# Patient Record
Sex: MALE | Race: BLACK OR AFRICAN AMERICAN
[De-identification: names, ages, dates, MRNs, and addresses within clinical notes are randomized per-mention and may not be internally consistent; named-entity substitution may affect disease eponyms.]

---

## 2017-11-01 ENCOUNTER — HOSPITAL ENCOUNTER (EMERGENCY)
Dept: HOSPITAL 76 - ED | Age: 57
Discharge: HOME | End: 2017-11-01
Payer: OTHER GOVERNMENT

## 2017-11-01 VITALS — DIASTOLIC BLOOD PRESSURE: 92 MMHG | SYSTOLIC BLOOD PRESSURE: 137 MMHG

## 2017-11-01 DIAGNOSIS — F17.200: ICD-10-CM

## 2017-11-01 DIAGNOSIS — I10: ICD-10-CM

## 2017-11-01 DIAGNOSIS — R07.89: Primary | ICD-10-CM

## 2017-11-01 LAB
ALBUMIN/GLOB SERPL: 0.7 {RATIO} (ref 1–2.2)
ANION GAP SERPL CALCULATED.4IONS-SCNC: 11 MMOL/L (ref 6–13)
BASOPHILS NFR BLD AUTO: 0 10^3/UL (ref 0–0.1)
BASOPHILS NFR BLD AUTO: 0.5 %
BILIRUB BLD-MCNC: 1.1 MG/DL (ref 0.2–1)
BUN SERPL-MCNC: 12 MG/DL (ref 6–20)
CALCIUM UR-MCNC: 9.5 MG/DL (ref 8.5–10.3)
CHLORIDE SERPL-SCNC: 99 MMOL/L (ref 101–111)
CO2 SERPL-SCNC: 27 MMOL/L (ref 21–32)
CREAT SERPLBLD-SCNC: 0.8 MG/DL (ref 0.6–1.2)
EOSINOPHIL # BLD AUTO: 0.1 10^3/UL (ref 0–0.7)
EOSINOPHIL NFR BLD AUTO: 1.3 %
ERYTHROCYTE [DISTWIDTH] IN BLOOD BY AUTOMATED COUNT: 13.8 % (ref 12–15)
GFRSERPLBLD MDRD-ARVRAT: 120 ML/MIN/{1.73_M2} (ref 89–?)
GLOBULIN SER-MCNC: 5.2 G/DL (ref 2.1–4.2)
GLUCOSE SERPL-MCNC: 99 MG/DL (ref 70–100)
HCT VFR BLD AUTO: 41.4 % (ref 42–52)
HGB UR QL STRIP: 14.4 G/DL (ref 14–18)
LIPASE SERPL-CCNC: 26 U/L (ref 22–51)
LYMPHOCYTES # SPEC AUTO: 1.4 10^3/UL (ref 1.5–3.5)
LYMPHOCYTES NFR BLD AUTO: 30.5 %
MCH RBC QN AUTO: 33.6 PG (ref 27–31)
MCHC RBC AUTO-ENTMCNC: 34.9 G/DL (ref 32–36)
MCV RBC AUTO: 96.3 FL (ref 80–94)
MONOCYTES # BLD AUTO: 0.5 10^3/UL (ref 0–1)
MONOCYTES NFR BLD AUTO: 10 %
NEUTROPHILS # BLD AUTO: 2.6 10^3/UL (ref 1.5–6.6)
NEUTROPHILS # SNV AUTO: 4.6 X10^3/UL (ref 4.8–10.8)
NEUTROPHILS NFR BLD AUTO: 57.7 %
NRBC # BLD AUTO: 0.1 /100WBC
PDW BLD AUTO: 7.7 FL (ref 7.4–11.4)
POTASSIUM SERPL-SCNC: 3.8 MMOL/L (ref 3.5–5)
PROT SPEC-MCNC: 8.9 G/DL (ref 6.7–8.2)
RBC MAR: 4.3 10^6/UL (ref 4.7–6.1)
SODIUM SERPLBLD-SCNC: 137 MMOL/L (ref 135–145)
WBC # BLD: 4.6 X10^3/UL

## 2017-11-01 PROCEDURE — 36415 COLL VENOUS BLD VENIPUNCTURE: CPT

## 2017-11-01 PROCEDURE — 96375 TX/PRO/DX INJ NEW DRUG ADDON: CPT

## 2017-11-01 PROCEDURE — 83690 ASSAY OF LIPASE: CPT

## 2017-11-01 PROCEDURE — 80053 COMPREHEN METABOLIC PANEL: CPT

## 2017-11-01 PROCEDURE — 71020: CPT

## 2017-11-01 PROCEDURE — 85025 COMPLETE CBC W/AUTO DIFF WBC: CPT

## 2017-11-01 PROCEDURE — 84484 ASSAY OF TROPONIN QUANT: CPT

## 2017-11-01 PROCEDURE — 86430 RHEUMATOID FACTOR TEST QUAL: CPT

## 2017-11-01 PROCEDURE — 99284 EMERGENCY DEPT VISIT MOD MDM: CPT

## 2017-11-01 PROCEDURE — 96374 THER/PROPH/DIAG INJ IV PUSH: CPT

## 2017-11-01 PROCEDURE — 99283 EMERGENCY DEPT VISIT LOW MDM: CPT

## 2017-11-01 PROCEDURE — 93005 ELECTROCARDIOGRAM TRACING: CPT

## 2017-11-01 PROCEDURE — 85651 RBC SED RATE NONAUTOMATED: CPT

## 2017-11-01 PROCEDURE — 86038 ANTINUCLEAR ANTIBODIES: CPT

## 2017-11-03 LAB — ANA SER QL: NEGATIVE

## 2018-05-23 ENCOUNTER — HOSPITAL ENCOUNTER (EMERGENCY)
Age: 58
Discharge: HOME | End: 2018-05-23
Payer: COMMERCIAL

## 2018-05-23 VITALS
DIASTOLIC BLOOD PRESSURE: 90 MMHG | HEART RATE: 89 BPM | OXYGEN SATURATION: 100 % | RESPIRATION RATE: 15 BRPM | SYSTOLIC BLOOD PRESSURE: 117 MMHG | TEMPERATURE: 98.06 F

## 2018-05-23 VITALS — BODY MASS INDEX: 26.3 KG/M2

## 2018-05-23 DIAGNOSIS — L85.3: Primary | ICD-10-CM

## 2018-05-23 PROCEDURE — 99282 EMERGENCY DEPT VISIT SF MDM: CPT

## 2018-05-23 NOTE — ED_ITS
"HPI - Skin/Abscess/Foreign Bdy    
<RADHA Kong - Last Filed: 05/23/18 21:37>    
General    
Chief complaint: Extremity Problem,Nontraumatic    
Stated complaint: RASH ON LEFT LEG    
Time Seen by Provider: 05/23/18 20:38    
History of Present Illness    
HPI narrative: 57-year-old male here for complaint of having dry flaky skin to   
his left leg with skin breaks.  He states that he has had symptoms like this   
for the past year.  He states that he does keep scratching the area due to itch   
and peeling off the flaky skin.  He denies any purulent drainage.  He denies   
any fevers or chills. No recent trauma to the area.  Patient is ambulatory into   
the emergency room.  No other concerns or complaints.    
MD complaint: rash    
Related Data    
 Previous Rx's    
    
    
    
 Medication  Instructions  Recorded    
     
mupirocin 1 applictn TOP BID #15 gram 05/23/18    
    
    
    
 Allergies    
    
    
    
Allergy/AdvReac Type Severity Reaction Status Date / Time    
     
No Known Drug Allergies Allergy   Verified 05/23/18 20:49    
    
    
    
    
Review of Systems    
<RADHA Kong - Last Filed: 05/23/18 21:37>    
Constitutional    
Denies chills, Denies fever(s), Denies lethargy and Denies weakness    
Eyes    
Denies change in vision, Denies eye discharge, Denies irritation and Denies   
loss of vision    
ENT    
Ears, Nose, Mouth, and Throat: Denies change in voice, Denies neck pain and   
Denies sore throat    
Cardiovascular    
Denies chest pain, Denies irregular heart rhythm, Denies lightheadedness,   
Denies palpitations, Denies dyspnea, Denies dyspnea on exertion and Denies   
orthopnea    
Respiratory    
Denies cough, Denies dyspnea, Denies dyspnea on exertion and Denies wheezing    
Genitourinary    
Denies hematuria, Denies flank pain, Denies urinary incontinence and Denies   
urinary urgency    
Musculoskeletal    
Denies neck pain    
Integumentary/Breasts    
Reports pruritus and Reports rash    
Neurologic    
Denies confusion, Denies loss of vision and Denies weakness    
Psychiatric    
Denies anxiety, Denies confusion, Denies depression, Denies homicidal ideation   
and Denies suicidal ideation    
Endocrine    
Denies palpitations    
Allergic/Immunologic    
Denies wheezing    
    
Exam    
<RADHA Kong - Last Filed: 05/23/18 21:37>    
Initial Vital Signs    
Initial Vital Signs:  Vital Signs    
    
    
    
Temperature  98.1 F   05/23/18 20:49    
     
Pulse Rate  89   05/23/18 20:49    
     
Respiratory Rate  15   05/23/18 20:49    
     
Blood Pressure  117/90 H  05/23/18 20:49    
     
Pulse Oximetry  100   05/23/18 20:49    
    
    
    
Const    
General: cooperative and well developed    
Nutritional Appearance: well nourished    
Orientation: alert, awake, oriented x3 and not confused    
[facility]    
Mouth: oral mucosae normal, oropharynx normal and moist mucous membranes    
Eyes    
Eyelids: eyelids normal    
Conjunctivae: conjunctivae normal    
Sclera: sclerae normal    
Cornea: corneas normal     
Pupils: PERRL    
Resp    
Effort & Inspection: normal respiratory effort, able to speak in complete   
sentences, no respiratory distress and no use of accessory muscles    
Auscultation: clear to auscultation bilaterally, no rales, no rhonchi and no   
wheezes    
Cardio    
Rate: regular rate    
Rhythm: regular rhythm    
Heart Sounds: no click, no gallops, no murmurs and no rubs    
Skin    
Other: Six in area of dry flaky skin with excoriation and abrasions to the left   
anterior shin.  Distal sensation is intact.  Distal range of motion is intact.    
Distal cap refill less than 2 sec.    
    
<Hero Tapia, DO - Last Filed: 05/23/18 22:09>    
Initial Vital Signs    
Initial Vital Signs:  Vital Signs    
    
    
    
Temperature  98.1 F   05/23/18 20:49    
     
Pulse Rate  89   05/23/18 20:49    
     
Respi
[id number]|2018-05-23 20:38:32|2018-05-23 20:38:32|ED.SKABFB||||"HPI - Skin/Abscess/Foreign Bdy

## 2018-05-23 NOTE — PC.NURSE
itchy, dry rash to lt shin no improvement with otc topical use. no swelling or erythema noted, several small open areas with scant bloody drainage

## 2018-07-14 ENCOUNTER — HOSPITAL ENCOUNTER (EMERGENCY)
Age: 58
Discharge: HOME | End: 2018-07-14
Payer: COMMERCIAL

## 2018-07-14 VITALS
RESPIRATION RATE: 15 BRPM | TEMPERATURE: 97.52 F | OXYGEN SATURATION: 95 % | DIASTOLIC BLOOD PRESSURE: 63 MMHG | SYSTOLIC BLOOD PRESSURE: 116 MMHG | HEART RATE: 88 BPM

## 2018-07-14 VITALS
SYSTOLIC BLOOD PRESSURE: 141 MMHG | DIASTOLIC BLOOD PRESSURE: 86 MMHG | HEART RATE: 89 BPM | OXYGEN SATURATION: 99 % | RESPIRATION RATE: 18 BRPM

## 2018-07-14 DIAGNOSIS — L03.116: Primary | ICD-10-CM

## 2018-07-14 DIAGNOSIS — I87.2: ICD-10-CM

## 2018-07-14 PROCEDURE — 99283 EMERGENCY DEPT VISIT LOW MDM: CPT

## 2018-07-14 NOTE — ED.EXTPRO
"HPI - Extremity Problem
General
Chief complaint: Extremity Problem,Nontraumatic
Stated complaint: left leg swelling and pain
Time Seen by Provider: 07/14/18 18:24
Source: patient and family
Mode of arrival: ambulatory
Limitations: no limitations
History of Present Illness
HPI Narrative: 57-year-old male with history lower like infection presents with a few days of worsening redness and pain with mild drainage of left anterior shin.  He states his problems all started when he struck his shin on a coffee table months 
ago.  He was seen here few months ago under similar circumstances.  He recently completed a course of clindamycin which completely cleared up his infection which has since returned.  He denies any new injury or systemic findings such as fever, 
chills nor nausea or vomiting
MD Complaint: extremity pain and extremity swelling
Onset (ago): day(s)
Location: left
Quality: burning and aching
Radiation: none
Relieving factors: nothing
Exacerbating factors: walking
Related Data
Home Medications

 Medication  Instructions  Recorded  Confirmed
aspirin 81 mg PO DAILY 07/14/18 07/14/18
clindamycin HCl 300 mg PO TID 07/14/18 07/14/18
cyclobenzaprine 10 mg PO TID 07/14/18 07/14/18
meloxicam 15 mg PO DAILY 07/14/18 07/14/18
nortriptyline 25 mg PO BEDTIME 07/14/18 07/14/18
omeprazole 20 mg PO DAILY 07/14/18 07/14/18
prazosin 1 mg PO BEDTIME 07/14/18 07/14/18
propranolol 20 mg PO BID 07/14/18 07/14/18
sertraline 50 mg PO DAILY 07/14/18 07/14/18
sildenafil 50 mg PO DAILY PRN 07/14/18 07/14/18
sofosbuvir-velpatasvir 1 tab PO DAILY 07/14/18 07/14/18

Previous Rx's

 Medication  Instructions  Recorded
mupirocin 1 applictn TOP BID #15 gram 05/23/18
clindamycin HCl 300 mg PO TID 7 Days #21 cap 07/14/18


Allergies

Allergy/AdvReac Type Severity Reaction Status Date / Time
No Known Drug Allergies Allergy   Verified 07/14/18 18:24



Review of Systems
Review of Systems
All systems reviewed & are unremarkable except as noted in HPI and below 
Constitutional
Denies chills, Denies fever(s), Denies lethargy and Denies weakness
Eyes
Denies change in vision, Denies eye discharge, Denies irritation and Denies loss of vision
ENT
Ears, Nose, Mouth, and Throat: Denies change in voice, Denies neck pain and Denies sore throat
Cardiovascular
Denies chest pain, Denies irregular heart rhythm, Denies lightheadedness, Denies palpitations, Denies dyspnea, Denies dyspnea on exertion and Denies orthopnea
Respiratory
Denies cough, Denies dyspnea, Denies dyspnea on exertion and Denies wheezing
Gastrointestinal
Gastrointestinal: Denies abdominal pain, Denies change in bowel habits, Denies diarrhea, Denies nausea and Denies vomiting
Genitourinary
Denies hematuria, Denies flank pain, Denies urinary incontinence and Denies urinary urgency
Musculoskeletal
Denies neck pain
Integumentary/Breasts
Reports dry skin, Denies pruritus, Reports erythema, Denies rash, Reports skin pain, Reports skin swelling, Reports sores and Denies wounds
Neurologic
Denies confusion, Denies loss of vision and Denies weakness
Psychiatric
Denies anxiety, Denies confusion, Denies depression, Denies homicidal ideation and Denies suicidal ideation
Endocrine
Denies palpitations
Hematologic/Lymphatic
Denies easy bruising
Allergic/Immunologic
Denies wheezing

PFSH
Social History
Smoking Status:  Current every day smoker




Exam
Initial Vital Signs
Initial Vital Signs:  Vital Signs

Temperature  97.5 F L  07/14/18 18:24
Pulse Rate  88   07/14/18 18:24
Respiratory Rate  15   07/14/18 18:24
Blood Pressure  116/63   07/14/18 18:24
Pulse Oximetry  95   07/14/18 18:24


Const
General: cooperative and well developed
Nutritional Appearance: well nourished
Orientation: alert, awake, oriented x3 and not confused
Eyes
General: appearance normal, both eyes and all related structures
Eyelids: eyelids normal
Conjunctivae: conjunctivae normal
Sclera: sclerae normal
Pupils: PERRL
EOM: EOM intact bilatera
[id number]|2018-07-14 18:51:41|2018-07-14 18:51:41|ED.EXTPRO||||"HPI - Extremity Problem

## 2018-07-25 ENCOUNTER — HOSPITAL ENCOUNTER (EMERGENCY)
Dept: HOSPITAL 73 - ED | Age: 58
Discharge: HOME | End: 2018-07-25
Payer: COMMERCIAL

## 2018-07-25 VITALS
HEART RATE: 86 BPM | OXYGEN SATURATION: 98 % | DIASTOLIC BLOOD PRESSURE: 90 MMHG | SYSTOLIC BLOOD PRESSURE: 117 MMHG | TEMPERATURE: 97.6 F | RESPIRATION RATE: 14 BRPM

## 2018-07-25 VITALS
SYSTOLIC BLOOD PRESSURE: 108 MMHG | DIASTOLIC BLOOD PRESSURE: 82 MMHG | RESPIRATION RATE: 16 BRPM | OXYGEN SATURATION: 96 % | HEART RATE: 75 BPM

## 2018-07-25 DIAGNOSIS — T78.40XA: Primary | ICD-10-CM

## 2018-07-25 PROCEDURE — 99282 EMERGENCY DEPT VISIT SF MDM: CPT

## 2018-07-25 NOTE — ED.SKABFB
"HPI - Skin/Abscess/Foreign Bdy
General
Chief complaint: Skin/Abscess/Foreign Body
Stated complaint: RASH,CELLULITIS LEG
Time Seen by Provider: 07/25/18 10:40
Source: patient and family
Mode of arrival: ambulatory
Limitations: no limitations
History of Present Illness
HPI narrative: 57-year-old pleasant male, known to myself and other staff presents with ongoing left anterior leg problems with sloughing of skin and some itchy plaque like lesions on his leg and now on his forearms.  He denies any trouble 
swallowing or breathing.  He has had no fever or chills
MD complaint: rash
Onset (ago): day(s)
Tetanus up to date: yes
Location: LLE
Severity: moderate
Quality: pruritic
Relieving factors: none
Exacerbating factors: none
Context: new medication
Treatments prior to arrival: none and antibiotic
Related Data
Home Medications

 Medication  Instructions  Recorded  Confirmed
aspirin 81 mg PO DAILY 07/14/18 07/25/18
cyclobenzaprine 10 mg PO TID 07/14/18 07/25/18
meloxicam 15 mg PO DAILY 07/14/18 07/25/18
nortriptyline 25 mg PO BEDTIME 07/14/18 07/25/18
omeprazole 20 mg PO DAILY 07/14/18 07/25/18
prazosin 1 mg PO BEDTIME 07/14/18 07/25/18
propranolol 20 mg PO BID 07/14/18 07/25/18
sertraline 50 mg PO DAILY 07/14/18 07/25/18
sildenafil 50 mg PO DAILY PRN 07/14/18 07/25/18
sofosbuvir-velpatasvir 1 tab PO DAILY 07/14/18 07/25/18

Previous Rx's

 Medication  Instructions  Recorded
mupirocin 1 applictn TOP BID #15 gram 05/23/18
prednisone 20 mg PO DAILY 5 Days  tab 07/25/18


Allergies

Allergy/AdvReac Type Severity Reaction Status Date / Time
No Known Drug Allergies Allergy   Verified 07/25/18 10:47



Review of Systems
Review of Systems
All systems reviewed & are unremarkable except as noted in HPI and below 
Constitutional
Denies chills, Denies fever(s), Denies lethargy and Denies weakness
Eyes
Denies change in vision, Denies eye discharge, Denies irritation and Denies loss of vision
ENT
Ears, Nose, Mouth, and Throat: Denies change in voice, Denies neck pain and Denies sore throat
Cardiovascular
Denies chest pain, Denies irregular heart rhythm, Denies lightheadedness, Denies palpitations, Denies dyspnea, Denies dyspnea on exertion and Denies orthopnea
Respiratory
Denies cough, Denies dyspnea, Denies dyspnea on exertion and Denies wheezing
Gastrointestinal
Gastrointestinal: Denies abdominal pain, Denies change in bowel habits, Denies diarrhea, Denies nausea and Denies vomiting
Genitourinary
Denies hematuria, Denies flank pain, Denies urinary incontinence and Denies urinary urgency
Musculoskeletal
Denies neck pain
Integumentary/Breasts
Reports pruritus, Reports lesions, Denies erythema, Reports rash and Denies wounds
Neurologic
Denies confusion, Denies loss of vision and Denies weakness
Psychiatric
Denies anxiety, Denies confusion, Denies depression, Denies homicidal ideation and Denies suicidal ideation
Endocrine
Denies palpitations
Hematologic/Lymphatic
Denies easy bruising
Allergic/Immunologic
Denies wheezing

PFSH
Medical History (Reviewed 07/25/18 @ 16:10 by Hero Tapia DO)

Anxiety (Acute)
Gastro-esophageal reflux (Acute)
Post traumatic stress disorder (PTSD) (Acute)


Social History (Reviewed 07/25/18 @ 16:10 by Hero Tapia DO)
Smoking Status:  Current every day smoker




Exam
Narrative
Exam Narrative: GEN: AOx3 and in mild distress

EYES: Pupils are equal, round, and reactive to light and accommodation. Extraoccular muscles are intact bilaterally. There is no subconjunctival hemorrhage or exudate.

CHEST: Lungs are clear to auscultation bilaterally and free of wheezes, rales, or rhonchi. Heart rate is regular rhythm, there are no murmurs, clicks, rubs, or gallops. There is no chest wall tenderness. 

ABD: Abdomen is soft and nontender.  There is no guarding or rebound. Bowel sounds are normal in all 4 quadrants. There is no mass or organomegaly.

EXT: Full painless ROM of all extremities with no loss of sensation or str
[id number]|2018-07-25 16:12:00|2018-07-25 11:44:00|ED_ITS|[facility]|Emergency Department|[id number]|"HPI - Skin/Abscess/Foreign Bdy

## 2018-08-01 ENCOUNTER — HOSPITAL ENCOUNTER (OUTPATIENT)
Age: 58
End: 2018-08-01
Payer: OTHER GOVERNMENT

## 2018-08-01 DIAGNOSIS — L03.116: Primary | ICD-10-CM

## 2018-08-01 PROCEDURE — 99213 OFFICE O/P EST LOW 20 MIN: CPT

## 2018-08-01 NOTE — OV.WND_ITS
Progress Note Details  
Patient Name: Marleen Camargo  
Patient Number: [id number]  
PatientPatientDate: 8/1/2018  
Clinician: Cheryle Cardenas  
Clinician Cosigner: Carito Lai  
Physician / Extender: Rubens Peoples  
--------------------------------------------------------------------------------
--------------  
SUBJECTIVE  
Chief Complaint  
This information was obtained from the patient  
Left Leg Cellulitis for two months.  
Allergies  
NKDA  
HPI  
This information was obtained from the patient  
8/1/18. Seen by Dr. Peoples. The patient is new to our clinic and presents with 
chronic cellulitis  
of the left lower leg and a recent history of associated ulcers which were 
recently reviewed in  
our ER. He's been treated with at least one course of antibiotics for this 
which is believed to be  
clindamycin and feels although helpful the cellulitis has not resolved. This 
morning he feels it's  
worse than over the past week, he reports pain in the lower leg, but does not 
report fevers or  
feeling unwell otherwise. He also does not have a known history of diabetes or 
PAD.  
Family History  
This information was obtained from the patient  
Unknown History - Maternal Grandparents, Paternal Grandparents, Diabetes - 
Mother, Father  
Social History  
This information was obtained from the patient  
Current every day smoker, Children - 2 , Lives in - With Ex-Wife, Marital 
Status -  but  
live with ex-wife, Retired - Disability,  - [employer]  
General Notes:  
Unable to get complete history. [facility] 
since his insurance  
will only cover 80% of this visit and he will have to pay 20%. Had Dr. Peoples 
come in to see  
patient before all history was gathered.  
Past Medical History  
This information was obtained from the patient  
Patient has a medical history of:  
Alzheimer's  
PTSD  
Hypertension  
Anxiety  
Hepatitis (Hep C)  
General Notes:  
Unable to obtain.  
Complaints and Symptoms  
This information was obtained from the patient  
Patient complains of:  
General Notes: I have reviewed and concur with the Review of Systems and Past 
Family Social  
History documents completed by the clinician, I have reviewed and concur with 
the Wound  
Assessment document completed by the clinician  
Cardiovascular (Central/Peripheral): Lower extremity (leg) swelling  
Prior Wound History: Drainage  
Psychiatric: Memory Loss  
Patient denies complaints or symptoms related to:  
Cardiovascular (Central/Peripheral): Intermittent Claudication, Lower extremity 
(leg) resting  
pain  
Constitutional Symptoms (General Health): Chills, Fever  
Ear/Nose/Mouth/Throat: Hearing Loss / Aid  
Hematologic/Lymphatic: Bleeding / Clotting Disorders, Bleeding Tendency  
Integumentary (Hair/Skin/Nails): Open Sore  
Neurological: Loss of Protective Sensation  
General Notes:  
[facility]. Flu, Shingles, Pneumonia, Hep B  
Additional Information  
Does patient have a history of Cancer? Yes? Complete all questions.: No  
Medications  
prazosin 1 mg capsule oral capsule oral take at bedtime  
propranolol 40 mg-hydrochlorothiazide 25 mg tablet oral 1/2 1/2 tablet oral 
twice daily  
sildenafil 50 mg tablet oral 1 1 tablet oral once daily for PRN  
sofosbuvir 400 mg-velpatasvir 100 mg tablet oral 1 1 tablet oral once daily  
meloxicam 15 mg tablet oral tablet oral once daily  
Aspir-81 81 mg tablet,delayed release oral tablet,delayed release (DR/EC) oral 
once daily  
omeprazole 20 mg capsule,delayed release oral capsule,delayed release(DR/EC) 
oral once  
daily  
sertraline 50 mg tablet oral 1 1 tablet oral once daily  
cyclobenzaprine 10 mg tablet oral tablet oral three times daily  
nortriptyline 25 mg capsule oral capsule oral take at bedtime  
doxycycline hyclate 100 mg capsule oral capsule oral twice daily for 10 days 
for cellulitis  
-------------------------------------------------------
[id number]|2018-08-01 00:00:00|2018-08-01 00:00:00|||||"

## 2020-05-12 ENCOUNTER — HOSPITAL ENCOUNTER (OUTPATIENT)
Dept: HOSPITAL 76 - EMS | Age: 60
End: 2020-05-12
Attending: SURGERY
Payer: OTHER GOVERNMENT

## 2020-05-12 DIAGNOSIS — M79.652: Primary | ICD-10-CM

## 2021-10-12 ENCOUNTER — HOSPITAL ENCOUNTER (EMERGENCY)
Age: 61
Discharge: HOME | End: 2021-10-12
Payer: OTHER GOVERNMENT

## 2021-10-12 VITALS — SYSTOLIC BLOOD PRESSURE: 162 MMHG | DIASTOLIC BLOOD PRESSURE: 95 MMHG | HEART RATE: 71 BPM | OXYGEN SATURATION: 97 %

## 2021-10-12 VITALS — DIASTOLIC BLOOD PRESSURE: 92 MMHG | OXYGEN SATURATION: 99 % | HEART RATE: 81 BPM | SYSTOLIC BLOOD PRESSURE: 148 MMHG

## 2021-10-12 VITALS
TEMPERATURE: 98.2 F | SYSTOLIC BLOOD PRESSURE: 146 MMHG | HEART RATE: 79 BPM | DIASTOLIC BLOOD PRESSURE: 99 MMHG | OXYGEN SATURATION: 100 % | RESPIRATION RATE: 18 BRPM

## 2021-10-12 VITALS — SYSTOLIC BLOOD PRESSURE: 160 MMHG | OXYGEN SATURATION: 100 % | HEART RATE: 70 BPM | DIASTOLIC BLOOD PRESSURE: 100 MMHG

## 2021-10-12 VITALS — DIASTOLIC BLOOD PRESSURE: 99 MMHG | OXYGEN SATURATION: 100 % | SYSTOLIC BLOOD PRESSURE: 146 MMHG | HEART RATE: 79 BPM

## 2021-10-12 VITALS — BODY MASS INDEX: 23 KG/M2

## 2021-10-12 VITALS — HEART RATE: 69 BPM | OXYGEN SATURATION: 99 % | DIASTOLIC BLOOD PRESSURE: 95 MMHG | SYSTOLIC BLOOD PRESSURE: 154 MMHG

## 2021-10-12 VITALS — OXYGEN SATURATION: 100 % | HEART RATE: 77 BPM

## 2021-10-12 VITALS — SYSTOLIC BLOOD PRESSURE: 153 MMHG | DIASTOLIC BLOOD PRESSURE: 91 MMHG | OXYGEN SATURATION: 100 % | HEART RATE: 77 BPM

## 2021-10-12 VITALS — OXYGEN SATURATION: 100 % | HEART RATE: 70 BPM

## 2021-10-12 DIAGNOSIS — K52.9: Primary | ICD-10-CM

## 2021-10-12 LAB
ADD MANUAL DIFF / SLIDE REVIEW: NO
ALBUMIN SERPL-MCNC: 4.6 G/DL (ref 3.5–5)
ALBUMIN/GLOB SERPL: 1.1 {RATIO} (ref 1–2.8)
ALP SERPL-CCNC: 135 U/L (ref 38–126)
ALT SERPL-CCNC: 31 IU/L (ref ?–50)
APPEARANCE UR: CLEAR
BILIRUBIN URINE UA: NEGATIVE
BUN SERPL-MCNC: 12 MG/DL (ref 9–20)
CALCIUM SERPL-MCNC: 8.8 MG/DL (ref 8.4–10.2)
CHLORIDE SERPL-SCNC: 105 MMOL/L (ref 98–107)
CO2 SERPL-SCNC: 25 MMOL/L (ref 22–32)
COLOR UR: YELLOW
CULTURE INDICATED URINE: (no result)
ESTIMATED GLOMERULAR FILT RATE: > 60 ML/MIN (ref 60–?)
GLOBULIN SER CALC-MCNC: 4.1 G/DL (ref 1.7–4.1)
GLUCOSE SERPL-MCNC: 96 MG/DL (ref 80–110)
GLUCOSE URINE UA: NEGATIVE G/DL
HEMATOCRIT: 40.9 % (ref 41–53)
HEMOGLOBIN: 14.2 G/DL (ref 13.5–17.5)
HEMOLYSIS: < 15 (ref 0–50)
HGB UR QL: NEGATIVE
KETONES URINE UA: NEGATIVE
LEUKOCYTE ESTERASE URINE UA: (no result)
LIPASE SERPL-CCNC: 252 U/L (ref 23–300)
LYMPHOCYTES # SPEC AUTO: 1700 /UL (ref 1100–4500)
MCV RBC: 98.7 FL (ref 80–100)
MEAN CORPUSCULAR HEMOGLOBIN: 34.2 PG (ref 26–34)
MEAN CORPUSCULAR HGB CONC: 34.6 % (ref 30–36)
NITRITE URINE UA: POSITIVE
PH UR: 6.5 [PH] (ref 4.5–8)
PLATELET COUNT: 216 X10^3/UL (ref 150–400)
POTASSIUM SERPL-SCNC: 3.7 MMOL/L (ref 3.4–5.1)
PROT SERPL-MCNC: 8.7 G/DL (ref 6.3–8.2)
PROTEIN URINE UA: NEGATIVE
SODIUM SERPL-SCNC: 141 MMOL/L (ref 137–145)
SP GR UR: <=1.005 (ref 1–1.03)
UROBILINOGEN UR QL: 0.2 E.U./DL

## 2021-10-12 PROCEDURE — 87086 URINE CULTURE/COLONY COUNT: CPT

## 2021-10-12 PROCEDURE — 87077 CULTURE AEROBIC IDENTIFY: CPT

## 2021-10-12 PROCEDURE — 74177 CT ABD & PELVIS W/CONTRAST: CPT

## 2021-10-12 PROCEDURE — 81001 URINALYSIS AUTO W/SCOPE: CPT

## 2021-10-12 PROCEDURE — 96361 HYDRATE IV INFUSION ADD-ON: CPT

## 2021-10-12 PROCEDURE — 36415 COLL VENOUS BLD VENIPUNCTURE: CPT

## 2021-10-12 PROCEDURE — 87186 SC STD MICRODIL/AGAR DIL: CPT

## 2021-10-12 PROCEDURE — 99284 EMERGENCY DEPT VISIT MOD MDM: CPT

## 2021-10-12 PROCEDURE — 85025 COMPLETE CBC W/AUTO DIFF WBC: CPT

## 2021-10-12 PROCEDURE — 96374 THER/PROPH/DIAG INJ IV PUSH: CPT

## 2021-10-12 PROCEDURE — 83690 ASSAY OF LIPASE: CPT

## 2021-10-12 PROCEDURE — 80053 COMPREHEN METABOLIC PANEL: CPT

## 2023-06-04 ENCOUNTER — HOSPITAL ENCOUNTER (EMERGENCY)
Age: 63
Discharge: HOME | End: 2023-06-04
Payer: OTHER GOVERNMENT

## 2023-06-04 VITALS
OXYGEN SATURATION: 99 % | SYSTOLIC BLOOD PRESSURE: 126 MMHG | RESPIRATION RATE: 16 BRPM | HEART RATE: 99 BPM | DIASTOLIC BLOOD PRESSURE: 80 MMHG | TEMPERATURE: 97.8 F

## 2023-06-04 VITALS
RESPIRATION RATE: 18 BRPM | DIASTOLIC BLOOD PRESSURE: 88 MMHG | TEMPERATURE: 97.6 F | SYSTOLIC BLOOD PRESSURE: 136 MMHG | HEART RATE: 88 BPM | OXYGEN SATURATION: 95 %

## 2023-06-04 VITALS — BODY MASS INDEX: 22.1 KG/M2

## 2023-06-04 DIAGNOSIS — Z79.899: ICD-10-CM

## 2023-06-04 DIAGNOSIS — N39.0: ICD-10-CM

## 2023-06-04 DIAGNOSIS — L03.116: Primary | ICD-10-CM

## 2023-06-04 DIAGNOSIS — R53.1: ICD-10-CM

## 2023-06-04 LAB
ADD MANUAL DIFF / SLIDE REVIEW: NO
ALBUMIN SERPL-MCNC: 4.9 G/DL (ref 3.5–5)
ALBUMIN/GLOB SERPL: 1.2 {RATIO} (ref 1–2.8)
ALP SERPL-CCNC: 149 U/L (ref 38–126)
ALT SERPL-CCNC: 42 IU/L (ref ?–50)
APPEARANCE UR: CLEAR
BILIRUBIN URINE UA: NEGATIVE
BUN SERPL-MCNC: 16 MG/DL (ref 9–20)
CALCIUM SERPL-MCNC: 8.7 MG/DL (ref 8.4–10.2)
CHLORIDE SERPL-SCNC: 104 MMOL/L (ref 98–107)
CO2 SERPL-SCNC: 26 MMOL/L (ref 22–32)
COLOR UR: YELLOW
CULTURE INDICATED URINE: (no result)
ESTIMATED GLOMERULAR FILT RATE: > 60 ML/MIN (ref 60–?)
GLOBULIN SER CALC-MCNC: 4 G/DL (ref 1.7–4.1)
GLUCOSE SERPL-MCNC: 92 MG/DL (ref 80–110)
GLUCOSE URINE UA: NEGATIVE G/DL
HEMATOCRIT: 41.2 % (ref 41–53)
HEMOGLOBIN: 14.5 G/DL (ref 13.5–17.5)
HEMOLYSIS: 56 (ref 0–50)
HGB UR QL: NEGATIVE
KETONES URINE UA: NEGATIVE
LACTATE SERPL-MCNC: 1.8 MMOL/L (ref 0.7–2.1)
LEUKOCYTE ESTERASE URINE UA: (no result)
LIPASE SERPL-CCNC: 342 U/L (ref 23–300)
LYMPHOCYTES # SPEC AUTO: 2300 /UL (ref 1100–4500)
MAGNESIUM SERPL-MCNC: 2.1 MG/DL (ref 1.6–2.3)
MCV RBC: 97.9 FL (ref 80–100)
MEAN CORPUSCULAR HEMOGLOBIN: 34.5 PG (ref 26–34)
MEAN CORPUSCULAR HGB CONC: 35.2 % (ref 30–36)
NITRITE URINE UA: POSITIVE
PH UR: 6.5 [PH] (ref 4.5–8)
PLATELET COUNT: 204 X10^3/UL (ref 150–400)
POTASSIUM SERPL-SCNC: 3.9 MMOL/L (ref 3.4–5.1)
PROT SERPL-MCNC: 8.9 G/DL (ref 6.3–8.2)
PROTEIN URINE UA: NEGATIVE
SODIUM SERPL-SCNC: 141 MMOL/L (ref 137–145)
SP GR UR: 1.01 (ref 1–1.03)
UROBILINOGEN UR QL: 1 E.U./DL

## 2023-06-04 PROCEDURE — 87086 URINE CULTURE/COLONY COUNT: CPT

## 2023-06-04 PROCEDURE — 93005 ELECTROCARDIOGRAM TRACING: CPT

## 2023-06-04 PROCEDURE — 93010 ELECTROCARDIOGRAM REPORT: CPT

## 2023-06-04 PROCEDURE — 83735 ASSAY OF MAGNESIUM: CPT

## 2023-06-04 PROCEDURE — 85025 COMPLETE CBC W/AUTO DIFF WBC: CPT

## 2023-06-04 PROCEDURE — 99284 EMERGENCY DEPT VISIT MOD MDM: CPT

## 2023-06-04 PROCEDURE — 87077 CULTURE AEROBIC IDENTIFY: CPT

## 2023-06-04 PROCEDURE — 36415 COLL VENOUS BLD VENIPUNCTURE: CPT

## 2023-06-04 PROCEDURE — 81001 URINALYSIS AUTO W/SCOPE: CPT

## 2023-06-04 PROCEDURE — 87186 SC STD MICRODIL/AGAR DIL: CPT

## 2023-06-04 PROCEDURE — 81003 URINALYSIS AUTO W/O SCOPE: CPT

## 2023-06-04 PROCEDURE — 80053 COMPREHEN METABOLIC PANEL: CPT

## 2023-06-04 PROCEDURE — 96360 HYDRATION IV INFUSION INIT: CPT

## 2023-06-04 PROCEDURE — 70450 CT HEAD/BRAIN W/O DYE: CPT

## 2023-06-04 PROCEDURE — 83690 ASSAY OF LIPASE: CPT

## 2023-06-04 PROCEDURE — 83605 ASSAY OF LACTIC ACID: CPT

## 2023-12-26 ENCOUNTER — HOSPITAL ENCOUNTER (EMERGENCY)
Dept: HOSPITAL 76 - ED | Age: 63
Discharge: HOME | End: 2023-12-26
Payer: OTHER GOVERNMENT

## 2023-12-26 VITALS — DIASTOLIC BLOOD PRESSURE: 78 MMHG | OXYGEN SATURATION: 99 % | SYSTOLIC BLOOD PRESSURE: 121 MMHG
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
## 2023-12-26 DIAGNOSIS — F17.200: ICD-10-CM

## 2023-12-26 DIAGNOSIS — S82.431A: Primary | ICD-10-CM
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
## 2023-12-26 PROCEDURE — 99283 EMERGENCY DEPT VISIT LOW MDM: CPT

## 2023-12-26 PROCEDURE — 73610 X-RAY EXAM OF ANKLE: CPT

## 2024-01-08 ENCOUNTER — HOSPITAL ENCOUNTER (OUTPATIENT)
Dept: HOSPITAL 76 - DI.WOS | Age: 64
Discharge: HOME | End: 2024-01-08
Attending: ORTHOPAEDIC SURGERY
Payer: OTHER GOVERNMENT

## 2024-01-08 DIAGNOSIS — S82.831D: Primary | ICD-10-CM

## 2024-02-08 ENCOUNTER — HOSPITAL ENCOUNTER (OUTPATIENT)
Dept: HOSPITAL 76 - DI.WOS | Age: 64
Discharge: HOME | End: 2024-02-08
Attending: ORTHOPAEDIC SURGERY
Payer: OTHER GOVERNMENT

## 2024-02-08 DIAGNOSIS — S82.64XD: Primary | ICD-10-CM

## 2024-03-25 ENCOUNTER — HOSPITAL ENCOUNTER (OUTPATIENT)
Dept: HOSPITAL 76 - DI.WOS | Age: 64
Discharge: HOME | End: 2024-03-25
Attending: ORTHOPAEDIC SURGERY
Payer: OTHER GOVERNMENT

## 2024-03-25 DIAGNOSIS — S82.64XD: Primary | ICD-10-CM
